# Patient Record
Sex: MALE | Race: WHITE | NOT HISPANIC OR LATINO | Employment: FULL TIME | URBAN - METROPOLITAN AREA
[De-identification: names, ages, dates, MRNs, and addresses within clinical notes are randomized per-mention and may not be internally consistent; named-entity substitution may affect disease eponyms.]

---

## 2018-08-06 ENCOUNTER — OFFICE VISIT (OUTPATIENT)
Dept: ORTHOPEDIC SURGERY | Facility: CLINIC | Age: 48
End: 2018-08-06

## 2018-08-06 VITALS
SYSTOLIC BLOOD PRESSURE: 165 MMHG | DIASTOLIC BLOOD PRESSURE: 120 MMHG | BODY MASS INDEX: 30.36 KG/M2 | HEIGHT: 69 IN | HEART RATE: 79 BPM | WEIGHT: 205 LBS

## 2018-08-06 DIAGNOSIS — R52 PAIN: Primary | ICD-10-CM

## 2018-08-06 DIAGNOSIS — M17.11 PRIMARY OSTEOARTHRITIS OF RIGHT KNEE: ICD-10-CM

## 2018-08-06 DIAGNOSIS — M23.203 OLD COMPLEX TEAR OF MEDIAL MENISCUS OF RIGHT KNEE: ICD-10-CM

## 2018-08-06 PROCEDURE — 73562 X-RAY EXAM OF KNEE 3: CPT | Performed by: ORTHOPAEDIC SURGERY

## 2018-08-06 PROCEDURE — 99204 OFFICE O/P NEW MOD 45 MIN: CPT | Performed by: ORTHOPAEDIC SURGERY

## 2018-08-06 RX ORDER — DICLOFENAC SODIUM 75 MG/1
TABLET, DELAYED RELEASE ORAL
Refills: 0 | COMMUNITY
Start: 2018-07-25 | End: 2018-08-23 | Stop reason: SDUPTHER

## 2018-08-06 RX ORDER — CYCLOBENZAPRINE HCL 10 MG
TABLET ORAL
Refills: 0 | COMMUNITY
Start: 2018-07-25

## 2018-08-06 NOTE — PROGRESS NOTES
Subjective: Right knee pain.           Patient ID: Jaime Sesay is a 47 y.o. male.    Chief Complaint:    History of Present Illness A 47-year-old male is seen for a work-related injury to his right knee sustained on 07/04/2018 when he slipped at work while mopping the floor.  He developed immediate pain and discomfort.  Patient subsequently was seen in the [facility] where an x-ray was completed as was an MRI.  The MRI was gone over with the patient and I reviewed the images and the report which shows a tear of the body of the posterior horn of the medial meniscus with degenerative changes in the medial compartment of the knee.  Also shows what appears to be a Baker's cyst.  He has been on Voltaren 75 mg b.i.d.  He states he is doing better and he rates his pain now as 1 out of 10.  He has returned to work.  He does office type work.  Prior to this injury, he had just minimal discomfort in the knee.            Social History     Occupational History   • Not on file.     Social History Main Topics   • Smoking status: Former Smoker     Quit date: 8/6/2004   • Smokeless tobacco: Not on file   • Alcohol use 0.6 oz/week     1 Shots of liquor per week   • Drug use: Unknown   • Sexual activity: Not on file      Review of Systems   Constitutional: Negative for chills, diaphoresis, fever and unexpected weight change.   HENT: Positive for tinnitus. Negative for hearing loss, nosebleeds and sore throat.    Eyes: Negative for pain and visual disturbance.   Respiratory: Negative for cough, shortness of breath and wheezing.    Cardiovascular: Positive for palpitations. Negative for chest pain.   Gastrointestinal: Negative for abdominal pain, diarrhea, nausea and vomiting.   Endocrine: Negative for cold intolerance, heat intolerance and polydipsia.   Genitourinary: Negative for difficulty urinating, dysuria and hematuria.   Musculoskeletal: Positive for arthralgias and joint swelling.   Skin:  Negative for rash and wound.   Allergic/Immunologic: Negative for environmental allergies.   Neurological: Negative for dizziness, syncope and numbness.   Hematological: Does not bruise/bleed easily.   Psychiatric/Behavioral: Negative for dysphoric mood and sleep disturbance. The patient is not nervous/anxious.    All other systems reviewed and are negative.        Past Medical History:   Diagnosis Date   • History of blood clots      No past surgical history on file.  Family History   Problem Relation Age of Onset   • Clotting disorder Paternal Uncle    • Cancer Maternal Grandmother    • Cancer Maternal Grandfather          Objective:  Vitals:    08/06/18 1408   BP: (!) 165/120   Pulse: 79     1    08/06/18  1408   Weight: 93 kg (205 lb)     Body mass index is 30.27 kg/m².       Ortho Exam  AP and lateral sunrise view of his knee to evaluate his chief complaint shows medial arthrosis and patellofemoral arthrosis of the knee. Review of the MRI images does show the tear of the body of the posterior horn of the medial mensicus with DJD in the medial compartment. He is alert and oriented x3. Head is normocephalic. Sclerae are clear. The right knee shows no swelling, effusion, erythema. He has 0° to 125° of motion with mild to moderate patellofemoral crepitus but no pain. No patellar subluxation. No varus or valgus instability. No instability 0° to 90°. Quad function 5/5. His calf is nontender with a negative Homans. The skin is cool to touch. Good distal pulses. No motor sensory deficit. Good capillary refill. No joint line tenderness with negative Eric's. He is tolerating the Voltaren without any GI side effects or discomfort. Again, he is doing quite well with minimal discomfort at this time.         Assessment:       1. Pain    2. Old complex tear of medial meniscus of right knee    3. Primary osteoarthritis of right knee          Plan:Over 25 minutes were spent reviewing his MRI images, his x-ray images, and  reviewing his physical findings and outlining the treatment plan going forward. Essentially at this time the patient is asymptomatic. I discussed with him the meniscal tear and i showed him the pathology involving a model. As he is asymptomatic i would not recommend any specific treatment, specifically arthroscopic surgery at this time. He is having no symptoms of the knee locking. Again, he is having no limitation of his activity. I will recommend he continue taking the Voltaren 75 mg twice a day. Return to see me at the [place] office on 08/30/2018, although he was told if he starts developing any increasing symptoms, particularly symptoms with any locking, he needs to call and be seen sooner.                 Work Status:    JOCELYNN query complete.    Orders:  Orders Placed This Encounter   Procedures   • XR Knee 3 View Right   • SCANNED - IMAGING       Medications:  No orders of the defined types were placed in this encounter.      Followup:  Return in about 24 days (around 8/30/2018).          Dragon transcription disclaimer     Much of this encounter note is an electronic transcription/translation of spoken language to printed text. The electronic translation of spoken language may permit erroneous, or at times, nonsensical words or phrases to be inadvertently transcribed. Although I have reviewed the note for such errors, some may still exist.

## 2018-08-08 ENCOUNTER — TELEPHONE (OUTPATIENT)
Dept: ORTHOPEDIC SURGERY | Facility: CLINIC | Age: 48
End: 2018-08-08

## 2018-08-08 NOTE — TELEPHONE ENCOUNTER
Work  Tete Busch (fax [phone]) with [employer] is calling asking work status for this patient. Please advise.

## 2018-08-09 NOTE — TELEPHONE ENCOUNTER
He has been released for full duty with no restrictions.  seen in the office no restrictions were given a return to work

## 2018-08-24 RX ORDER — DICLOFENAC SODIUM 75 MG/1
TABLET, DELAYED RELEASE ORAL
Qty: 30 TABLET | Refills: 0 | Status: SHIPPED | OUTPATIENT
Start: 2018-08-24 | End: 2018-08-30

## 2018-08-30 ENCOUNTER — OFFICE VISIT (OUTPATIENT)
Dept: ORTHOPEDIC SURGERY | Facility: CLINIC | Age: 48
End: 2018-08-30

## 2018-08-30 VITALS — HEIGHT: 69 IN | WEIGHT: 210 LBS | BODY MASS INDEX: 31.1 KG/M2

## 2018-08-30 DIAGNOSIS — M23.203 OLD COMPLEX TEAR OF MEDIAL MENISCUS OF RIGHT KNEE: ICD-10-CM

## 2018-08-30 DIAGNOSIS — M17.11 PRIMARY OSTEOARTHRITIS OF RIGHT KNEE: ICD-10-CM

## 2018-08-30 DIAGNOSIS — R52 PAIN: Primary | ICD-10-CM

## 2018-08-30 PROCEDURE — 99213 OFFICE O/P EST LOW 20 MIN: CPT | Performed by: ORTHOPAEDIC SURGERY

## 2018-08-30 RX ORDER — MELOXICAM 7.5 MG/1
7.5 TABLET ORAL DAILY
Qty: 30 TABLET | Refills: 5 | Status: SHIPPED | OUTPATIENT
Start: 2018-08-30

## 2018-08-30 NOTE — PROGRESS NOTES
Subjective: Right knee pain     Patient ID: Jaime Sesay is a 47 y.o. male.    Chief Complaint:    History of Present Illness 47-year-old is seen in follow-up to the right knee injury work-related incident.  Following a cortisone injection given on last visit and noted almost complete relief of all his pain discomfort but still feels the Baker's cyst in the back.  He has been working without restrictions.  Has not gotten the anti-inflammatory medication because work comp apparently would not authorize it       Social History     Occupational History   • Not on file.     Social History Main Topics   • Smoking status: Former Smoker     Quit date: 8/6/2004   • Smokeless tobacco: Never Used   • Alcohol use 0.6 oz/week     1 Shots of liquor per week   • Drug use: No   • Sexual activity: Defer      Review of Systems   Constitutional: Negative for chills, diaphoresis, fever and unexpected weight change.   HENT: Negative for hearing loss, nosebleeds, sore throat and tinnitus.    Eyes: Negative for pain and visual disturbance.   Respiratory: Negative for cough, shortness of breath and wheezing.    Cardiovascular: Negative for chest pain and palpitations.   Gastrointestinal: Negative for abdominal pain, diarrhea, nausea and vomiting.   Endocrine: Negative for cold intolerance, heat intolerance and polydipsia.   Genitourinary: Negative for difficulty urinating, dysuria and hematuria.   Musculoskeletal: Positive for joint swelling and myalgias. Negative for arthralgias.   Skin: Negative for rash and wound.   Allergic/Immunologic: Negative for environmental allergies.   Neurological: Negative for dizziness, syncope and numbness.   Hematological: Does not bruise/bleed easily.   Psychiatric/Behavioral: Negative for dysphoric mood and sleep disturbance. The patient is not nervous/anxious.          Past Medical History:   Diagnosis Date   • History of blood clots      History reviewed. No pertinent surgical history.  Family  History   Problem Relation Age of Onset   • Clotting disorder Paternal Uncle    • Cancer Maternal Grandmother    • Cancer Maternal Grandfather          Objective:  There were no vitals filed for this visit.  1    08/30/18  0843   Weight: 95.3 kg (210 lb)     Body mass index is 31.01 kg/m².       Ortho Exam is alert and oriented ×3.  Knee shows no effusion he does have a moderate Baker's cyst posteriorly.  He has 0-125° of motion crepitus but no pain no patella subluxation.  No joint line tenderness.  Quad function 5 over 5.  Skin is cool to touch.  No joint line tenderness but negative Eric's.  No instability 0 90° no varus valgus instability.  Calf is nontender.  Good distal pulses no motor sensory deficit.  Good capillary refill.  Taken anti-inflammatories in the past without any GI side effects     Assessment:       1. Pain    2. Old complex tear of medial meniscus of right knee    3. Primary osteoarthritis of right knee          Plan: I do recommend an anti-inflammatory and we'll prescribe meloxicam.  He is taken daily return to see me 4 weeks for final check to ensure that he remains asymptomatic.  Again he had pre-existing injury that is aggravated by the work-related            Work Status:    JOCELYNN query complete.    Orders:  No orders of the defined types were placed in this encounter.      Medications:  New Medications Ordered This Visit   Medications   • meloxicam (MOBIC) 7.5 MG tablet     Sig: Take 1 tablet by mouth Daily.     Dispense:  30 tablet     Refill:  5       Followup:  No Follow-up on file.          Dictated utilizing Dragon dictation